# Patient Record
Sex: FEMALE | Race: WHITE | Employment: FULL TIME | URBAN - METROPOLITAN AREA
[De-identification: names, ages, dates, MRNs, and addresses within clinical notes are randomized per-mention and may not be internally consistent; named-entity substitution may affect disease eponyms.]

---

## 2020-03-05 ENCOUNTER — APPOINTMENT (OUTPATIENT)
Dept: GENERAL RADIOLOGY | Age: 30
End: 2020-03-05
Attending: NURSE PRACTITIONER
Payer: COMMERCIAL

## 2020-03-05 ENCOUNTER — HOSPITAL ENCOUNTER (OUTPATIENT)
Age: 30
Discharge: HOME | End: 2020-03-05
Payer: COMMERCIAL

## 2020-03-05 VITALS
WEIGHT: 155 LBS | HEART RATE: 74 BPM | BODY MASS INDEX: 33 KG/M2 | SYSTOLIC BLOOD PRESSURE: 110 MMHG | TEMPERATURE: 99 F | OXYGEN SATURATION: 97 % | DIASTOLIC BLOOD PRESSURE: 60 MMHG | RESPIRATION RATE: 20 BRPM

## 2020-03-05 DIAGNOSIS — J06.9 UPPER RESPIRATORY TRACT INFECTION, UNSPECIFIED TYPE: Primary | ICD-10-CM

## 2020-03-05 DIAGNOSIS — H65.93 FLUID LEVEL BEHIND TYMPANIC MEMBRANE OF BOTH EARS: ICD-10-CM

## 2020-03-05 PROCEDURE — 71046 X-RAY EXAM CHEST 2 VIEWS: CPT | Performed by: NURSE PRACTITIONER

## 2020-03-05 PROCEDURE — 99203 OFFICE O/P NEW LOW 30 MIN: CPT

## 2020-03-05 PROCEDURE — 99204 OFFICE O/P NEW MOD 45 MIN: CPT

## 2020-03-05 RX ORDER — METHYLPREDNISOLONE 4 MG/1
TABLET ORAL
Qty: 1 PACKAGE | Refills: 0 | Status: SHIPPED | OUTPATIENT
Start: 2020-03-05 | End: 2020-06-12

## 2020-03-05 NOTE — ED PROVIDER NOTES
Patient presents with:  Cough/URI      HPI:     Luba Erazo is a 34year old female with no significant past medical history presents with a chief complaint of cough, fever, earache. Pt reports that the symptoms started 2 days ago.  No feels like it is do not believe antibiotics are warranted at this time as this is most likely viral. Pt verbalized the plan of care and states understanding. Xr Chest Pa + Lat Chest (cpt=71046)    Result Date: 3/5/2020  CONCLUSION:  1.  No acute cardiopulmonary disease

## 2020-03-05 NOTE — ED INITIAL ASSESSMENT (HPI)
PATIENT ARRIVED AMBULATORY TO ROOM C/O SYMPTOMS THAT STARTED 2 DAYS AGO. +NON PRODUCTIVE COUGH. +LOW GRADE FEVERS. PATIENT STATES \"MY EARS FEEL LIKE I CAN HEAR THE OCEAN\" INTERMITTENT DIZZINESS. NO N/V/D. SLIGHT NASAL CONGESTION.  EASY NON LABORED RESPIRA

## 2020-07-20 PROBLEM — E28.2 PCOS (POLYCYSTIC OVARIAN SYNDROME): Status: ACTIVE | Noted: 2020-07-20

## 2020-09-03 PROBLEM — E66.9 OBESITY (BMI 30-39.9): Status: ACTIVE | Noted: 2020-09-03

## 2020-09-03 PROBLEM — Z51.81 ENCOUNTER FOR THERAPEUTIC DRUG MONITORING: Status: ACTIVE | Noted: 2020-09-03

## 2020-09-03 PROBLEM — E55.9 VITAMIN D DEFICIENCY: Status: ACTIVE | Noted: 2020-09-03

## 2021-10-06 ENCOUNTER — HOSPITAL ENCOUNTER (OUTPATIENT)
Age: 31
Discharge: HOME | End: 2021-10-06
Payer: COMMERCIAL

## 2021-10-06 VITALS
SYSTOLIC BLOOD PRESSURE: 120 MMHG | HEART RATE: 72 BPM | DIASTOLIC BLOOD PRESSURE: 74 MMHG | TEMPERATURE: 98 F | RESPIRATION RATE: 18 BRPM | OXYGEN SATURATION: 100 %

## 2021-10-06 DIAGNOSIS — R19.7 DIARRHEA, UNSPECIFIED TYPE: Primary | ICD-10-CM

## 2021-10-06 PROCEDURE — 99213 OFFICE O/P EST LOW 20 MIN: CPT

## 2021-10-06 PROCEDURE — 87880 STREP A ASSAY W/OPTIC: CPT

## 2021-10-06 PROCEDURE — 81002 URINALYSIS NONAUTO W/O SCOPE: CPT

## 2021-10-06 PROCEDURE — 81025 URINE PREGNANCY TEST: CPT

## 2021-10-06 RX ORDER — ONDANSETRON 4 MG/1
4 TABLET, ORALLY DISINTEGRATING ORAL EVERY 4 HOURS PRN
Qty: 10 TABLET | Refills: 0 | Status: SHIPPED | OUTPATIENT
Start: 2021-10-06 | End: 2021-10-13

## 2021-10-06 RX ORDER — DICYCLOMINE HYDROCHLORIDE 10 MG/5ML
20 SOLUTION ORAL ONCE
Status: COMPLETED | OUTPATIENT
Start: 2021-10-06 | End: 2021-10-06

## 2021-10-06 RX ORDER — ONDANSETRON 4 MG/1
4 TABLET, ORALLY DISINTEGRATING ORAL ONCE
Status: COMPLETED | OUTPATIENT
Start: 2021-10-06 | End: 2021-10-06

## 2021-10-06 RX ORDER — DICYCLOMINE HCL 20 MG
20 TABLET ORAL 4 TIMES DAILY PRN
Qty: 30 TABLET | Refills: 0 | Status: SHIPPED | OUTPATIENT
Start: 2021-10-06 | End: 2021-11-05

## 2021-10-06 NOTE — ED INITIAL ASSESSMENT (HPI)
N/v/d since Sunday, no abd pain, no fever, no cough, no urinary symptoms, denies recent travel or sick contact
Normal
Normal

## 2021-10-11 NOTE — ED PROVIDER NOTES
Patient Seen in: [facility]    History   Patient presents with:  Nausea/Vomiting/Diarrhea    Stated Complaint: nausea,diahrrea, dizziness    HPI    46-year-old female with a history of anxiety, depression, PCOS, here today with nausea, vomiti today and agreed except as otherwise stated in HPI.     Physical Exam     ED Triage Vitals [10/06/21 1439]   /74   Pulse 72   Resp 18   Temp 97.7 °F (36.5 °C)   Temp src Temporal   SpO2 100 %   O2 Device None (Room air)       Current:/74   Pulse Basic labs were performed and did not reveal any leukocytosis,   Patient is afebrile, does not appear toxic and does not meet SIRS criteria. Imaging studies were performed which did not reveal any evidence of obstruction, inflammation or perforation.  Vanessa

## 2021-10-20 ENCOUNTER — HOSPITAL ENCOUNTER (OUTPATIENT)
Age: 31
Discharge: HOME | End: 2021-10-20
Attending: EMERGENCY MEDICINE
Payer: OTHER MISCELLANEOUS

## 2021-10-20 ENCOUNTER — APPOINTMENT (OUTPATIENT)
Dept: GENERAL RADIOLOGY | Age: 31
End: 2021-10-20
Attending: EMERGENCY MEDICINE
Payer: OTHER MISCELLANEOUS

## 2021-10-20 VITALS
TEMPERATURE: 98 F | HEART RATE: 75 BPM | DIASTOLIC BLOOD PRESSURE: 91 MMHG | SYSTOLIC BLOOD PRESSURE: 131 MMHG | RESPIRATION RATE: 20 BRPM | OXYGEN SATURATION: 97 %

## 2021-10-20 DIAGNOSIS — S20.211A CONTUSION OF RIGHT CHEST WALL, INITIAL ENCOUNTER: Primary | ICD-10-CM

## 2021-10-20 PROCEDURE — 99213 OFFICE O/P EST LOW 20 MIN: CPT

## 2021-10-20 PROCEDURE — 71101 X-RAY EXAM UNILAT RIBS/CHEST: CPT | Performed by: EMERGENCY MEDICINE
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
## 2021-10-27 ENCOUNTER — HOSPITAL ENCOUNTER (OUTPATIENT)
Age: 31
Discharge: HOME | End: 2021-10-27
Payer: COMMERCIAL

## 2021-10-27 VITALS
OXYGEN SATURATION: 100 % | RESPIRATION RATE: 18 BRPM | TEMPERATURE: 98 F | DIASTOLIC BLOOD PRESSURE: 87 MMHG | HEART RATE: 68 BPM | SYSTOLIC BLOOD PRESSURE: 117 MMHG

## 2021-10-27 DIAGNOSIS — J02.0 STREPTOCOCCAL SORE THROAT: Primary | ICD-10-CM

## 2021-10-27 PROCEDURE — 87880 STREP A ASSAY W/OPTIC: CPT

## 2021-10-27 PROCEDURE — 99213 OFFICE O/P EST LOW 20 MIN: CPT

## 2021-10-27 RX ORDER — AMOXICILLIN 500 MG/1
500 TABLET, FILM COATED ORAL 2 TIMES DAILY
Qty: 20 TABLET | Refills: 0 | Status: SHIPPED | OUTPATIENT
Start: 2021-10-27 | End: 2021-11-06

## 2021-10-27 NOTE — ED PROVIDER NOTES
Patient Seen in: [facility]      History   Patient presents with:  Sore Throat    Stated Complaint: runny nose,sore throat,fever,body aches    Subjective:   HPI    31yr old female here today with c/o sore throat, runny nose, fever, body aches Heart: S1-S2. Regular rate and rhythm. Lungs: good inspiratory effort. +air entry bilaterally without wheezes, rhonchi, crackles. No accessory muscle use or tachypnea. Extremities: No edema. Pulses 2+ extremities.         Skin: No rash no sore throat  (primary encounter diagnosis)     Disposition:  Discharge  10/27/2021  8:32 am    Follow-up:  Vini Hargrove MD  [address]  [phone]                Medications Prescribed:  Discharge Medication L

## 2021-12-08 ENCOUNTER — HOSPITAL ENCOUNTER (OUTPATIENT)
Age: 31
Discharge: HOME | End: 2021-12-08
Attending: EMERGENCY MEDICINE
Payer: COMMERCIAL

## 2021-12-08 VITALS
SYSTOLIC BLOOD PRESSURE: 122 MMHG | RESPIRATION RATE: 18 BRPM | OXYGEN SATURATION: 99 % | TEMPERATURE: 98 F | DIASTOLIC BLOOD PRESSURE: 72 MMHG | HEART RATE: 86 BPM

## 2021-12-08 DIAGNOSIS — J06.9 VIRAL URI: Primary | ICD-10-CM

## 2021-12-08 PROCEDURE — 99212 OFFICE O/P EST SF 10 MIN: CPT

## 2021-12-08 PROCEDURE — 87880 STREP A ASSAY W/OPTIC: CPT

## 2021-12-08 NOTE — ED PROVIDER NOTES
Patient Seen in: [facility]      History   Patient presents with:  Cough/URI    Stated Complaint: covid test    Subjective:   HPI    The patient is a 33-year-old female with no significant past medical history, fully Covid vaccinated but no b Regular rate and rhythm without murmur  Abdomen: Soft, nontender and nondistended  Neurologic: Patient is awake, alert and oriented ×3.   The patient's motor strength is 5 out of 5 and symmetric in the upper and lower extremities bilaterally  Extremities: N

## 2022-01-03 ENCOUNTER — HOSPITAL ENCOUNTER (OUTPATIENT)
Age: 32
Discharge: HOME | End: 2022-01-03
Payer: COMMERCIAL

## 2022-01-03 VITALS
SYSTOLIC BLOOD PRESSURE: 127 MMHG | DIASTOLIC BLOOD PRESSURE: 49 MMHG | RESPIRATION RATE: 18 BRPM | OXYGEN SATURATION: 100 % | TEMPERATURE: 98 F | HEART RATE: 65 BPM

## 2022-01-03 DIAGNOSIS — R09.89 RUNNY NOSE: ICD-10-CM

## 2022-01-03 DIAGNOSIS — R51.9 ACUTE NONINTRACTABLE HEADACHE, UNSPECIFIED HEADACHE TYPE: Primary | ICD-10-CM

## 2022-01-03 DIAGNOSIS — Z20.822 ENCOUNTER FOR SCREENING LABORATORY TESTING FOR COVID-19 VIRUS: ICD-10-CM

## 2022-01-03 PROCEDURE — 99213 OFFICE O/P EST LOW 20 MIN: CPT

## 2022-01-03 NOTE — ED PROVIDER NOTES
Patient Seen in: [facility]      History   Patient presents with:  Runny Nose    Stated Complaint: headache,runny nose    Subjective:   HPI    This is a 45-year-old female presenting with runny nose headache Covid exposure fully vaccinated no effort is normal. No respiratory distress. Breath sounds: Normal breath sounds. No wheezing. Musculoskeletal:         General: Normal range of motion. Cervical back: Normal range of motion. Skin:     General: Skin is warm and dry.       Lars Stephen

## 2022-01-05 LAB — SARS-COV-2 RNA RESP QL NAA+PROBE: NOT DETECTED

## 2022-01-15 ENCOUNTER — HOSPITAL ENCOUNTER (OUTPATIENT)
Age: 32
Discharge: HOME | End: 2022-01-15
Payer: COMMERCIAL

## 2022-01-15 VITALS
RESPIRATION RATE: 22 BRPM | DIASTOLIC BLOOD PRESSURE: 51 MMHG | SYSTOLIC BLOOD PRESSURE: 107 MMHG | OXYGEN SATURATION: 100 % | HEART RATE: 92 BPM | TEMPERATURE: 100 F

## 2022-01-15 DIAGNOSIS — Z20.822 SUSPECTED COVID-19 VIRUS INFECTION: Primary | ICD-10-CM

## 2022-01-15 DIAGNOSIS — J02.9 SORE THROAT: ICD-10-CM

## 2022-01-15 DIAGNOSIS — Z20.822 ENCOUNTER FOR LABORATORY TESTING FOR COVID-19 VIRUS: ICD-10-CM

## 2022-01-15 LAB — S PYO AG THROAT QL: NEGATIVE

## 2022-01-15 PROCEDURE — 99213 OFFICE O/P EST LOW 20 MIN: CPT

## 2022-01-15 PROCEDURE — 87880 STREP A ASSAY W/OPTIC: CPT

## 2022-01-15 NOTE — ED PROVIDER NOTES
Patient Seen in: [facility]      History   Patient presents with:  Sore Throat    Stated Complaint: sore throat headache     Subjective:   HPI    21-year-old female with medical history as listed below here for evaluation of sore throat, head Extraocular Movements: Extraocular movements intact. Pupils: Pupils are equal, round, and reactive to light. Cardiovascular:      Rate and Rhythm: Normal rate.    Pulmonary:      Effort: Pulmonary effort is normal.   Abdominal:      General: Abdomen

## 2022-01-18 LAB — SARS-COV-2 RNA RESP QL NAA+PROBE: DETECTED

## 2022-04-09 ENCOUNTER — HOSPITAL ENCOUNTER (OUTPATIENT)
Age: 32
Discharge: HOME | End: 2022-04-09
Payer: COMMERCIAL

## 2022-04-09 VITALS
WEIGHT: 135 LBS | DIASTOLIC BLOOD PRESSURE: 73 MMHG | BODY MASS INDEX: 28.34 KG/M2 | RESPIRATION RATE: 18 BRPM | TEMPERATURE: 98 F | HEIGHT: 58 IN | HEART RATE: 95 BPM | OXYGEN SATURATION: 98 % | SYSTOLIC BLOOD PRESSURE: 118 MMHG

## 2022-04-09 DIAGNOSIS — J34.89 NASAL CONGESTION WITH RHINORRHEA: ICD-10-CM

## 2022-04-09 DIAGNOSIS — R05.9 COUGH: ICD-10-CM

## 2022-04-09 DIAGNOSIS — Z20.822 LAB TEST NEGATIVE FOR COVID-19 VIRUS: ICD-10-CM

## 2022-04-09 DIAGNOSIS — Z20.822 ENCOUNTER FOR SCREENING LABORATORY TESTING FOR COVID-19 VIRUS: ICD-10-CM

## 2022-04-09 DIAGNOSIS — J02.9 VIRAL PHARYNGITIS: Primary | ICD-10-CM

## 2022-04-09 DIAGNOSIS — R09.81 NASAL CONGESTION WITH RHINORRHEA: ICD-10-CM

## 2022-04-09 LAB
S PYO AG THROAT QL: NEGATIVE
SARS-COV-2 RNA RESP QL NAA+PROBE: NOT DETECTED

## 2022-04-09 PROCEDURE — 99214 OFFICE O/P EST MOD 30 MIN: CPT

## 2022-04-09 PROCEDURE — 99213 OFFICE O/P EST LOW 20 MIN: CPT

## 2022-04-09 PROCEDURE — 87880 STREP A ASSAY W/OPTIC: CPT

## 2022-04-09 RX ORDER — FLUTICASONE PROPIONATE 50 MCG
2 SPRAY, SUSPENSION (ML) NASAL DAILY
Qty: 16 G | Refills: 0 | Status: SHIPPED | OUTPATIENT
Start: 2022-04-09

## 2022-04-09 RX ORDER — BENZONATATE 100 MG/1
100 CAPSULE ORAL 3 TIMES DAILY PRN
Qty: 12 CAPSULE | Refills: 0 | Status: SHIPPED | OUTPATIENT
Start: 2022-04-09

## 2022-04-09 RX ORDER — CETIRIZINE HYDROCHLORIDE 10 MG/1
10 TABLET ORAL DAILY
Qty: 30 TABLET | Refills: 0 | Status: SHIPPED | OUTPATIENT
Start: 2022-04-09

## 2022-09-25 ENCOUNTER — HOSPITAL ENCOUNTER (OUTPATIENT)
Age: 32
Discharge: HOME | End: 2022-09-25

## 2022-09-25 VITALS
RESPIRATION RATE: 18 BRPM | OXYGEN SATURATION: 98 % | HEART RATE: 98 BPM | SYSTOLIC BLOOD PRESSURE: 109 MMHG | TEMPERATURE: 98 F | DIASTOLIC BLOOD PRESSURE: 76 MMHG

## 2022-09-25 DIAGNOSIS — J02.0 STREPTOCOCCAL SORE THROAT: ICD-10-CM

## 2022-09-25 DIAGNOSIS — H66.005 RECURRENT ACUTE SUPPURATIVE OTITIS MEDIA WITHOUT SPONTANEOUS RUPTURE OF LEFT TYMPANIC MEMBRANE: Primary | ICD-10-CM

## 2022-09-25 LAB — S PYO AG THROAT QL: POSITIVE

## 2022-09-25 PROCEDURE — 99213 OFFICE O/P EST LOW 20 MIN: CPT

## 2022-09-25 PROCEDURE — 87880 STREP A ASSAY W/OPTIC: CPT

## 2022-09-25 RX ORDER — AMOXICILLIN 500 MG/1
500 TABLET, FILM COATED ORAL 3 TIMES DAILY
Qty: 30 TABLET | Refills: 0 | Status: SHIPPED | OUTPATIENT
Start: 2022-09-25 | End: 2022-10-05

## 2022-09-25 NOTE — ED INITIAL ASSESSMENT (HPI)
Patient states that she has had a sore throat, headache, body aches, and left ear pain since yesterday morning.  Patient requesting strep test.

## 2022-10-24 ENCOUNTER — HOSPITAL ENCOUNTER (OUTPATIENT)
Age: 32
Discharge: HOME | End: 2022-10-24
Payer: COMMERCIAL

## 2022-10-24 VITALS
HEART RATE: 74 BPM | TEMPERATURE: 98 F | OXYGEN SATURATION: 99 % | SYSTOLIC BLOOD PRESSURE: 113 MMHG | RESPIRATION RATE: 16 BRPM | DIASTOLIC BLOOD PRESSURE: 68 MMHG

## 2022-10-24 DIAGNOSIS — B34.9 VIRAL ILLNESS: Primary | ICD-10-CM

## 2022-10-24 LAB
S PYO AG THROAT QL: NEGATIVE
SARS-COV-2 RNA RESP QL NAA+PROBE: NOT DETECTED

## 2022-10-24 PROCEDURE — 99213 OFFICE O/P EST LOW 20 MIN: CPT

## 2022-10-24 PROCEDURE — 87880 STREP A ASSAY W/OPTIC: CPT

## 2022-10-24 PROCEDURE — 99212 OFFICE O/P EST SF 10 MIN: CPT

## 2022-12-11 ENCOUNTER — HOSPITAL ENCOUNTER (OUTPATIENT)
Age: 32
Discharge: HOME | End: 2022-12-11
Payer: COMMERCIAL

## 2022-12-11 VITALS
HEART RATE: 71 BPM | OXYGEN SATURATION: 99 % | DIASTOLIC BLOOD PRESSURE: 76 MMHG | SYSTOLIC BLOOD PRESSURE: 110 MMHG | RESPIRATION RATE: 16 BRPM | TEMPERATURE: 98 F

## 2022-12-11 DIAGNOSIS — R30.0 DYSURIA: Primary | ICD-10-CM

## 2022-12-11 PROCEDURE — 99214 OFFICE O/P EST MOD 30 MIN: CPT

## 2022-12-11 PROCEDURE — 87086 URINE CULTURE/COLONY COUNT: CPT | Performed by: NURSE PRACTITIONER

## 2022-12-11 RX ORDER — NITROFURANTOIN 25; 75 MG/1; MG/1
100 CAPSULE ORAL 2 TIMES DAILY
Qty: 14 CAPSULE | Refills: 0 | Status: SHIPPED | OUTPATIENT
Start: 2022-12-11 | End: 2022-12-18

## 2022-12-11 NOTE — DISCHARGE INSTRUCTIONS
Please push fluids and make sure you are staying hydrated. Start antibiotic and complete entire course of treatment. We will notify you of the results in 2 to 3 days.   Any worsening symptoms please go to the emergency department

## 2023-01-09 ENCOUNTER — HOSPITAL ENCOUNTER (OUTPATIENT)
Age: 33
Discharge: HOME | End: 2023-01-09
Attending: EMERGENCY MEDICINE
Payer: COMMERCIAL

## 2023-01-09 VITALS
HEART RATE: 74 BPM | DIASTOLIC BLOOD PRESSURE: 79 MMHG | RESPIRATION RATE: 16 BRPM | SYSTOLIC BLOOD PRESSURE: 122 MMHG | TEMPERATURE: 97 F | OXYGEN SATURATION: 100 %

## 2023-01-09 DIAGNOSIS — B34.9 VIRAL SYNDROME: Primary | ICD-10-CM

## 2023-01-09 LAB
POCT INFLUENZA A: NEGATIVE
POCT INFLUENZA B: NEGATIVE
SARS-COV-2 RNA RESP QL NAA+PROBE: NOT DETECTED

## 2023-01-09 PROCEDURE — 87502 INFLUENZA DNA AMP PROBE: CPT | Performed by: EMERGENCY MEDICINE

## 2023-01-09 PROCEDURE — 99213 OFFICE O/P EST LOW 20 MIN: CPT

## 2023-01-09 NOTE — ED INITIAL ASSESSMENT (HPI)
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 Reports headache, nausea, diarrhea, body aches, runny nose and fever since Friday. Denies cough or sore throat.

## 2023-11-07 ENCOUNTER — APPOINTMENT (OUTPATIENT)
Dept: GENERAL RADIOLOGY | Age: 33
End: 2023-11-07
Attending: PHYSICIAN ASSISTANT
Payer: COMMERCIAL

## 2023-11-07 ENCOUNTER — HOSPITAL ENCOUNTER (OUTPATIENT)
Age: 33
Discharge: HOME | End: 2023-11-07
Payer: COMMERCIAL

## 2023-11-07 VITALS
OXYGEN SATURATION: 100 % | DIASTOLIC BLOOD PRESSURE: 76 MMHG | HEART RATE: 77 BPM | TEMPERATURE: 98 F | SYSTOLIC BLOOD PRESSURE: 119 MMHG | RESPIRATION RATE: 16 BRPM

## 2023-11-07 DIAGNOSIS — R05.1 ACUTE COUGH: Primary | ICD-10-CM

## 2023-11-07 DIAGNOSIS — R42 LIGHTHEADEDNESS: ICD-10-CM

## 2023-11-07 DIAGNOSIS — R06.00 DYSPNEA, UNSPECIFIED TYPE: ICD-10-CM

## 2023-11-07 DIAGNOSIS — J02.9 ACUTE PHARYNGITIS, UNSPECIFIED ETIOLOGY: ICD-10-CM

## 2023-11-07 LAB
#MXD IC: 0.5 X10ˆ3/UL (ref 0.1–1)
ATRIAL RATE: 63 BPM
B-HCG UR QL: NEGATIVE
BILIRUB UR QL STRIP: NEGATIVE
BUN BLD-MCNC: 7 MG/DL (ref 7–18)
CHLORIDE BLD-SCNC: 104 MMOL/L (ref 98–112)
CO2 BLD-SCNC: 27 MMOL/L (ref 21–32)
COLOR UR: YELLOW
CREAT BLD-MCNC: 0.6 MG/DL
DDIMER WHOLE BLOOD: <200 NG/ML DDU (ref ?–400)
EGFRCR SERPLBLD CKD-EPI 2021: 123 ML/MIN/1.73M2 (ref 60–?)
GLUCOSE BLD-MCNC: 85 MG/DL (ref 70–99)
GLUCOSE UR STRIP-MCNC: NEGATIVE MG/DL
HCT VFR BLD AUTO: 43.6 %
HCT VFR BLD CALC: 47 %
HGB BLD-MCNC: 15 G/DL
HGB UR QL STRIP: NEGATIVE
ISTAT IONIZED CALCIUM FOR CHEM 8: 1.33 MMOL/L (ref 1.12–1.32)
KETONES UR STRIP-MCNC: NEGATIVE MG/DL
LEUKOCYTE ESTERASE UR QL STRIP: NEGATIVE
LYMPHOCYTES # BLD AUTO: 1.6 X10ˆ3/UL (ref 1–4)
LYMPHOCYTES NFR BLD AUTO: 18.8 %
MCH RBC QN AUTO: 32.4 PG (ref 26–34)
MCHC RBC AUTO-ENTMCNC: 34.4 G/DL (ref 31–37)
MCV RBC AUTO: 94.2 FL (ref 80–100)
MIXED CELL %: 6.3 %
NEUTROPHILS # BLD AUTO: 6.2 X10ˆ3/UL (ref 1.5–7.7)
NEUTROPHILS NFR BLD AUTO: 74.9 %
NITRITE UR QL STRIP: NEGATIVE
P AXIS: 54 DEGREES
P-R INTERVAL: 134 MS
PH UR STRIP: 7.5 [PH]
PLATELET # BLD AUTO: 179 X10ˆ3/UL (ref 150–450)
POTASSIUM BLD-SCNC: 4.2 MMOL/L (ref 3.6–5.1)
PROT UR STRIP-MCNC: NEGATIVE MG/DL
Q-T INTERVAL: 430 MS
QRS DURATION: 80 MS
QTC CALCULATION (BEZET): 440 MS
R AXIS: 28 DEGREES
RBC # BLD AUTO: 4.63 X10ˆ6/UL
S PYO AG THROAT QL IA.RAPID: NEGATIVE
SARS-COV-2 RNA RESP QL NAA+PROBE: NOT DETECTED
SODIUM BLD-SCNC: 143 MMOL/L (ref 136–145)
SP GR UR STRIP: 1.01
T AXIS: 55 DEGREES
TROPONIN I BLD-MCNC: <0.02 NG/ML
UROBILINOGEN UR STRIP-ACNC: <2 MG/DL
VENTRICULAR RATE: 63 BPM
WBC # BLD AUTO: 8.3 X10ˆ3/UL (ref 4–11)

## 2023-11-07 PROCEDURE — 84484 ASSAY OF TROPONIN QUANT: CPT

## 2023-11-07 PROCEDURE — 99215 OFFICE O/P EST HI 40 MIN: CPT

## 2023-11-07 PROCEDURE — 85025 COMPLETE CBC W/AUTO DIFF WBC: CPT | Performed by: PHYSICIAN ASSISTANT

## 2023-11-07 PROCEDURE — 80047 BASIC METABLC PNL IONIZED CA: CPT

## 2023-11-07 PROCEDURE — 81002 URINALYSIS NONAUTO W/O SCOPE: CPT

## 2023-11-07 PROCEDURE — 93010 ELECTROCARDIOGRAM REPORT: CPT

## 2023-11-07 PROCEDURE — 81025 URINE PREGNANCY TEST: CPT

## 2023-11-07 PROCEDURE — 87651 STREP A DNA AMP PROBE: CPT | Performed by: PHYSICIAN ASSISTANT

## 2023-11-07 PROCEDURE — 93005 ELECTROCARDIOGRAM TRACING: CPT

## 2023-11-07 PROCEDURE — 96360 HYDRATION IV INFUSION INIT: CPT

## 2023-11-07 PROCEDURE — 71046 X-RAY EXAM CHEST 2 VIEWS: CPT | Performed by: PHYSICIAN ASSISTANT

## 2023-11-07 PROCEDURE — 85378 FIBRIN DEGRADE SEMIQUANT: CPT | Performed by: PHYSICIAN ASSISTANT

## 2023-11-07 RX ORDER — SODIUM CHLORIDE 9 MG/ML
1000 INJECTION, SOLUTION INTRAVENOUS ONCE
Status: COMPLETED | OUTPATIENT
Start: 2023-11-07 | End: 2023-11-07

## 2023-11-07 RX ORDER — IBUPROFEN 600 MG/1
TABLET ORAL
Qty: 20 TABLET | Refills: 0 | Status: SHIPPED | OUTPATIENT
Start: 2023-11-07

## 2023-11-07 RX ORDER — BENZONATATE 100 MG/1
100 CAPSULE ORAL 3 TIMES DAILY PRN
Qty: 30 CAPSULE | Refills: 0 | Status: SHIPPED | OUTPATIENT
Start: 2023-11-07 | End: 2023-12-07

## 2023-11-07 RX ORDER — ALBUTEROL SULFATE 90 UG/1
2 AEROSOL, METERED RESPIRATORY (INHALATION) EVERY 4 HOURS PRN
Qty: 1 EACH | Refills: 0 | Status: SHIPPED | OUTPATIENT
Start: 2023-11-07 | End: 2023-12-07

## 2023-11-07 NOTE — ED INITIAL ASSESSMENT (HPI)
Patient arrives ambulatory with c/o headache, bodyaches, diarrhea, cough, lightheadedness, fever, difficulty breathing x 2 days. Reports sore throat that began this morning as well.

## (undated) NOTE — LETTER
Date & Time: 1/9/2023, 8:36 AM  Patient: Barbara Adams  Encounter Provider(s):    Marcelle Cummings MD       To Whom It May Concern:    Saint Plenty was seen and treated in our department on 1/9/2023. She may return to work when fever free for 24 hours.   If you have any questions or concerns, please do not hesitate to call.        _____________________________  Physician/APC Signature

## (undated) NOTE — LETTER
Date & Time: 11/7/2023, 10:20 AM  Patient: Pat Vasquez  Encounter Provider(s):    BLAS Lea       To Whom It May Concern:    Yasmine Bailey was seen and treated in our department on 11/7/2023. She may return to work on 11/8/2023.     If you have any questions or concerns, please do not hesitate to call.        _____________________________  Physician/APC Signature
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
## (undated) NOTE — LETTER
Date & Time: 9/25/2022, 8:24 AM  Patient: Coral Mask  Encounter Provider(s):    KRISTIE Daugherty       To Whom It May Concern:    Felicity Yee was seen and treated in our department on 9/25/2022. She should not return to work until 9/26/22.     If you have any questions or concerns, please do not hesitate to call.        _____________________________  Physician/APC Signature

## (undated) NOTE — LETTER
Date & Time: 10/27/2021, 8:32 AM  Patient: Clement Adams  Encounter Provider(s):    KRISTIE Nath       To Whom It May Concern:    Vance Devries was seen and treated in our department on 10/27/2021.  She can return to work 10/29/2021 with no r

## (undated) NOTE — LETTER
Date & Time: 10/6/2021, 3:42 PM  Patient: Chema Dorman  Encounter Provider(s):    KRISTIE Araujo       To Whom It May Concern:    Juany Tomlin was seen and treated in our department on 10/6/2021. She can return to work 10/11/2021.     If you

## (undated) NOTE — LETTER
Date & Time: 1/3/2022, 1:52 PM  Patient: Johan Pop Adams  Encounter Provider(s):    KRISTIE Long       To Whom It May Concern:    Yasmine Bailey was seen and treated in our department on 1/3/2022.  She should not return to work until Jonatan boggs

## (undated) NOTE — LETTER
Date & Time: 10/20/2021, 7:23 PM  Patient: Fuad Handler  Encounter Provider(s):    Renan Rios MD       To Whom It May Concern:    Krysten Mack was seen and treated in our department on 10/20/2021.  She should not return to work until 10/22/2021

## (undated) NOTE — LETTER
Date & Time: 4/9/2022, 11:59 AM  Patient: Neno Kay  Encounter Provider(s):    KRISTIE Lyon       To Whom It May Concern:    Simon Myers was seen and treated in our department on 4/9/2022. She should not return to work until 04/12/2022. If you have any questions or concerns, please do not hesitate to call.       KEITH Cole  _____________________________  [id number] Signature